# Patient Record
Sex: MALE | Race: WHITE | NOT HISPANIC OR LATINO | Employment: FULL TIME | URBAN - METROPOLITAN AREA
[De-identification: names, ages, dates, MRNs, and addresses within clinical notes are randomized per-mention and may not be internally consistent; named-entity substitution may affect disease eponyms.]

---

## 2020-12-13 ENCOUNTER — SPECIALTY PHARMACY (OUTPATIENT)
Dept: PHARMACY | Facility: CLINIC | Age: 55
End: 2020-12-13

## 2020-12-18 PROBLEM — L40.50 PSORIATIC ARTHRITIS: Status: ACTIVE | Noted: 2020-12-18

## 2021-01-15 ENCOUNTER — SPECIALTY PHARMACY (OUTPATIENT)
Dept: PHARMACY | Facility: CLINIC | Age: 56
End: 2021-01-15

## 2021-05-11 ENCOUNTER — SPECIALTY PHARMACY (OUTPATIENT)
Dept: PHARMACY | Facility: CLINIC | Age: 56
End: 2021-05-11

## 2021-05-17 ENCOUNTER — TELEPHONE (OUTPATIENT)
Dept: PHARMACY | Facility: CLINIC | Age: 56
End: 2021-05-17

## 2021-10-15 ENCOUNTER — SPECIALTY PHARMACY (OUTPATIENT)
Dept: PHARMACY | Facility: CLINIC | Age: 56
End: 2021-10-15

## 2021-11-04 ENCOUNTER — SPECIALTY PHARMACY (OUTPATIENT)
Dept: PHARMACY | Facility: CLINIC | Age: 56
End: 2021-11-04

## 2021-12-27 PROBLEM — S68.012A AMPUTATION OF LEFT THUMB: Status: ACTIVE | Noted: 2021-12-27

## 2022-01-25 ENCOUNTER — SPECIALTY PHARMACY (OUTPATIENT)
Dept: PHARMACY | Facility: CLINIC | Age: 57
End: 2022-01-25

## 2022-01-25 NOTE — TELEPHONE ENCOUNTER
Taltz 2 pack order received and MDO confirmed pt injects 1 mL monthly. The 2 pen pack needs PA but the 1mL pen does not, current authorization is on file for 1 mL /28 DS through 11/2022. [facility] is NOT in network per test claim.    Message sent to provider to please send order for Taltz 1 mL and we can send to our benefits investigation team to see where he can fill.

## 2022-01-25 NOTE — TELEPHONE ENCOUNTER
Confirmed with Jennifer Rinaldi rep that pt has active coverage and same plan and authorization applies as previous [facility] [id number] approved through 11/8/2022.    Prescriber must send in order for Taltz single pen injector in order for claim to go through and pt still needs to fill through [facility]. Office notified and will send updated order explained we can route to [facility] if sent here.

## 2022-03-16 ENCOUNTER — SPECIALTY PHARMACY (OUTPATIENT)
Dept: PHARMACY | Facility: CLINIC | Age: 57
End: 2022-03-16

## 2022-03-16 DIAGNOSIS — L40.50 PSORIATIC ARTHRITIS: Primary | ICD-10-CM

## 2022-03-24 NOTE — TELEPHONE ENCOUNTER
Cosentyx for [facility] scripts Case Id: [id number] approved through 9/17/2022. Order queued to [employer] investigation completion.

## 2022-03-24 NOTE — TELEPHONE ENCOUNTER
BI Complete cosentyx     Per [facility] helpdesk    OOP   7500; 460.1  Deductible 250   ; met  Copay $55   OSP OON - must use [facility]  PA Cosentyx for [facility] scripts Case Id: [id number] approved through 9/17/2022.    Forward to FA for review

## 2022-03-25 NOTE — TELEPHONE ENCOUNTER
[facility] to prevent further delay. Called pt to notify and advise him to enroll in copay card on-line to bring down cost. No answer, LVM

## 2022-09-22 ENCOUNTER — SPECIALTY PHARMACY (OUTPATIENT)
Dept: PHARMACY | Facility: CLINIC | Age: 57
End: 2022-09-22

## 2022-09-22 ENCOUNTER — PATIENT MESSAGE (OUTPATIENT)
Dept: PHARMACY | Facility: CLINIC | Age: 57
End: 2022-09-22

## 2022-09-22 NOTE — TELEPHONE ENCOUNTER
Luci, this is Artem Austin with [facility].  We are working on your prescription that your doctor has sent us. We will be working with your insurance to get this approved for you. We will be calling you along the way with updates on your medication.  If you have any questions, you can reach us at [phone].    Welcome call outcome: No answer/Unable to leave voicemail

## 2023-02-21 ENCOUNTER — SPECIALTY PHARMACY (OUTPATIENT)
Dept: PHARMACY | Facility: CLINIC | Age: 58
End: 2023-02-21

## 2023-02-21 NOTE — TELEPHONE ENCOUNTER
Taltz PA submitted via [facility] web portal   Key: [id number]    [employer] does not manage PA for this patient. Please contact the number on the back of the members card for further assistance.  Will follow-up with plan.

## 2023-02-21 NOTE — TELEPHONE ENCOUNTER
Luci, this is Artem Austin with [facility].  We are working on your prescription that your doctor has sent us. We will be working with your insurance to get this approved for you. We will be calling you along the way with updates on your medication.  If you have any questions, you can reach us at [phone].    Welcome call outcome: Left voicemail

## 2023-02-21 NOTE — TELEPHONE ENCOUNTER
Taltz PA submitted to Rx benefits via [employer]  Prior Auth (EOC) ID:  [id number]

## 2023-02-28 NOTE — TELEPHONE ENCOUNTER
Updated patient on status of Taltz appeal and answered questions regarding obtaining medication when/if it is approved.

## 2023-03-03 NOTE — TELEPHONE ENCOUNTER
Outgoing call to patient regarding Taltz approval.  Patient consented to obtain co-pay card on his behalf.  RXBIN: [id number]  PCN: [id number]  GRP: [id number]  ID: [id number]  Expiration Date: 12/31/2025    Staff message sent to update provider.  OSP OON. Patient must fill at [facility]  Rx transferred. Closing out at [facility].

## 2023-03-03 NOTE — TELEPHONE ENCOUNTER
Incoming call from pt inquiring if Taltz rx was for loading dose. Informed him the rx was. Pt voiced understanding and had no further questions.

## 2023-03-03 NOTE — TELEPHONE ENCOUNTER
Taltz denial overturned.  Coverage approved from 03/02/23 to 09/02/23.  EOC #: [id number]  Approval letter scanned into media.

## 2025-03-14 NOTE — TELEPHONE ENCOUNTER
Called pt to confirm appt with Dr. Nobles on Monday. Pt did not answer. LVM. Called pts fathers phone, he gave the call to Mr. Brannon and he confirmed the appt.    OSP OON. Patient must fill at [facility]    - Attempted to call patient, voicemail box full. Patient informed via patient portal.   -Will transfer Rx and close out at OSP.